# Patient Record
Sex: FEMALE | Race: OTHER | HISPANIC OR LATINO | Employment: STUDENT | URBAN - METROPOLITAN AREA
[De-identification: names, ages, dates, MRNs, and addresses within clinical notes are randomized per-mention and may not be internally consistent; named-entity substitution may affect disease eponyms.]

---
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
## 2022-10-11 ENCOUNTER — OFFICE VISIT (OUTPATIENT)
Dept: URGENT CARE | Facility: MEDICAL CENTER | Age: 22
End: 2022-10-11

## 2022-10-11 VITALS
HEIGHT: 64 IN | TEMPERATURE: 97.8 F | SYSTOLIC BLOOD PRESSURE: 104 MMHG | WEIGHT: 148.15 LBS | OXYGEN SATURATION: 99 % | DIASTOLIC BLOOD PRESSURE: 72 MMHG | RESPIRATION RATE: 16 BRPM | BODY MASS INDEX: 25.29 KG/M2 | HEART RATE: 67 BPM

## 2022-10-11 DIAGNOSIS — Z02.4 DRIVER'S PERMIT PE (PHYSICAL EXAMINATION): Primary | ICD-10-CM

## 2022-10-11 RX ORDER — METRONIDAZOLE 500 MG/1
500 TABLET ORAL 2 TIMES DAILY
COMMUNITY
Start: 2022-10-11 | End: 2022-10-18

## 2022-10-11 NOTE — PROGRESS NOTES
[address] Now        NAME: Dk Coley is a 25 y o  female  : 2000    MRN: [id number]  DATE: 2022  TIME: 5:11 PM    Assessment and Plan   's permit PE (physical examination) [Z02 4]  1  's permit PE (physical examination)           Patient Instructions       Follow up with PCP in 3-5 days  Proceed to  ER if symptoms worsen  Chief Complaint     Chief Complaint   Patient presents with   • Annual Exam     's Permit  Uncorrected vision  Colors WDL  R eye 20/40, L eye 20/20, B eyes 20/20         History of Present Illness       76-year-old female here today for 's form of physical exam   Denies any medical complaint  Review of Systems   Review of Systems   Respiratory: Negative  Cardiovascular: Negative  Gastrointestinal: Negative  Neurological: Negative  All other systems reviewed and are negative  Current Medications       Current Outpatient Medications:   •  metroNIDAZOLE (FLAGYL) 500 mg tablet, Take 500 mg by mouth 2 (two) times a day, Disp: , Rfl:     Current Allergies     Allergies as of 10/11/2022   • (No Known Allergies)            The following portions of the patient's history were reviewed and updated as appropriate: allergies, current medications, past family history, past medical history, past social history, past surgical history and problem list      History reviewed  No pertinent past medical history  History reviewed  No pertinent surgical history  History reviewed  No pertinent family history  Medications have been verified  Objective   /72   Pulse 67   Temp 97 8 °F (36 6 °C)   Resp 16   Ht 5' 4" (1 626 m)   Wt 67 2 kg (148 lb 2 4 oz)   SpO2 99%   BMI 25 43 kg/m²   No LMP recorded  Physical Exam     Physical Exam  Vitals and nursing note reviewed  Constitutional:       Appearance: Normal appearance     HENT:      Right Ear: Tympanic membrane normal       Left Ear: Tympanic membrane normal    Eyes:      Extraocular Movements: Extraocular movements intact  Pupils: Pupils are equal, round, and reactive to light  Cardiovascular:      Rate and Rhythm: Normal rate and regular rhythm  Pulmonary:      Effort: Pulmonary effort is normal       Breath sounds: Normal breath sounds  Musculoskeletal:         General: Normal range of motion  Cervical back: Normal range of motion and neck supple  Neurological:      General: No focal deficit present  Mental Status: She is alert and oriented to person, place, and time

## 2022-10-11 NOTE — PATIENT INSTRUCTIONS
Normal 's permit physical exam   Patient's form completed at this time with no restrictions  Normal Exam   WHAT YOU NEED TO KNOW:   Your healthcare provider did not find a reason for your symptoms today  You may need to follow up with your healthcare provider or a specialist  He will work with you to try to find the cause of your symptoms  He may also run tests to find out more about your overall health  DISCHARGE INSTRUCTIONS:   Follow up with your healthcare provider or a specialist as directed:  Tell your healthcare provider about your symptoms  You may be given a complete physical exam and health checkup  Write down your questions so you remember to ask them during your visits  Maintain a healthy lifestyle:  Healthy foods and regular physical activity can improve your health  They also decrease your risk of heart disease, high blood pressure, and diabetes  Get 30 minutes of activity every day  most days of the week  Ask your healthcare provider which activities are best for you  You can do 30 minutes at once or spread your activity throughout the day to get the recommended amount  Eat a variety of healthy foods  Healthy foods include whole-grain breads, low-fat dairy products, beans, lean meats, and fish  Eat fruits and vegetables every day, especially those that are green, orange, and red  Maintain a healthy weight  Ask your healthcare provider how much you should weigh  Ask him to help you create a weight loss plan if you are overweight  Limit alcohol  Women should limit alcohol to 1 drink a day  Men should limit alcohol to 2 drinks a day  A drink of alcohol is 12 ounces of beer, 5 ounces of wine, or 1½ ounces of liquor  Do not smoke: If you smoke, it is never too late to quit  You lower your risk for many health problems if you quit  Ask your healthcare provider for information if you need help quitting     Contact your healthcare provider if:   Your symptoms get worse, or you have new symptoms that bother you  You have questions or concerns about your condition or care  Your illness makes it difficult to follow a healthy diet  Return to the emergency department if:   You have trouble breathing  You have chest pain  You feel lightheaded or faint  © Copyright [employer] 2022 Information is for End User's use only and may not be sold, redistributed or otherwise used for commercial purposes  All illustrations and images included in CareNotes® are the copyrighted property of [employer]  or Bertha Baez   The above information is an  only  It is not intended as medical advice for individual conditions or treatments  Talk to your doctor, nurse or pharmacist before following any medical regimen to see if it is safe and effective for you

## 2023-05-31 ENCOUNTER — APPOINTMENT (OUTPATIENT)
Dept: LAB | Age: 23
End: 2023-05-31

## 2023-05-31 ENCOUNTER — OCCMED (OUTPATIENT)
Dept: URGENT CARE | Age: 23
End: 2023-05-31

## 2023-05-31 DIAGNOSIS — Z02.1 PHYSICAL EXAM, PRE-EMPLOYMENT: Primary | ICD-10-CM

## 2023-05-31 DIAGNOSIS — Z02.1 PHYSICAL EXAM, PRE-EMPLOYMENT: ICD-10-CM

## 2023-05-31 PROCEDURE — 86480 TB TEST CELL IMMUN MEASURE: CPT

## 2023-05-31 PROCEDURE — 36415 COLL VENOUS BLD VENIPUNCTURE: CPT

## 2023-06-01 LAB
GAMMA INTERFERON BACKGROUND BLD IA-ACNC: 0.03 IU/ML
M TB IFN-G BLD-IMP: NEGATIVE
M TB IFN-G CD4+ BCKGRND COR BLD-ACNC: 0.01 IU/ML
M TB IFN-G CD4+ BCKGRND COR BLD-ACNC: 0.02 IU/ML
MITOGEN IGNF BCKGRD COR BLD-ACNC: >10 IU/ML

## 2024-06-27 ENCOUNTER — APPOINTMENT (OUTPATIENT)
Dept: LAB | Age: 24
End: 2024-06-27

## 2024-06-27 ENCOUNTER — APPOINTMENT (OUTPATIENT)
Dept: URGENT CARE | Age: 24
End: 2024-06-27

## 2024-06-27 ENCOUNTER — OCCMED (OUTPATIENT)
Dept: URGENT CARE | Age: 24
End: 2024-06-27

## 2024-06-27 DIAGNOSIS — Z02.1 PRE-EMPLOYMENT EXAMINATION: ICD-10-CM

## 2024-06-27 DIAGNOSIS — Z02.1 PRE-EMPLOYMENT EXAMINATION: Primary | ICD-10-CM

## 2024-06-27 PROCEDURE — 36415 COLL VENOUS BLD VENIPUNCTURE: CPT

## 2024-06-27 PROCEDURE — 86480 TB TEST CELL IMMUN MEASURE: CPT

## 2024-06-28 LAB
GAMMA INTERFERON BACKGROUND BLD IA-ACNC: 0.01 IU/ML
M TB IFN-G BLD-IMP: NEGATIVE
M TB IFN-G CD4+ BCKGRND COR BLD-ACNC: 0.05 IU/ML
M TB IFN-G CD4+ BCKGRND COR BLD-ACNC: 0.06 IU/ML
MITOGEN IGNF BCKGRD COR BLD-ACNC: 9.99 IU/ML

## 2024-09-27 ENCOUNTER — HOSPITAL ENCOUNTER (EMERGENCY)
Facility: HOSPITAL | Age: 24
Discharge: HOME | End: 2024-09-28
Attending: EMERGENCY MEDICINE

## 2024-09-27 VITALS
RESPIRATION RATE: 16 BRPM | BODY MASS INDEX: 27.62 KG/M2 | HEART RATE: 68 BPM | SYSTOLIC BLOOD PRESSURE: 116 MMHG | DIASTOLIC BLOOD PRESSURE: 66 MMHG | OXYGEN SATURATION: 99 % | TEMPERATURE: 98.4 F | WEIGHT: 160.94 LBS

## 2024-09-27 DIAGNOSIS — K08.89 PAIN, DENTAL: Primary | ICD-10-CM

## 2024-09-27 PROCEDURE — 99282 EMERGENCY DEPT VISIT SF MDM: CPT

## 2024-09-27 PROCEDURE — 99284 EMERGENCY DEPT VISIT MOD MDM: CPT | Performed by: EMERGENCY MEDICINE

## 2024-09-28 RX ORDER — ACETAMINOPHEN 325 MG/1
650 TABLET ORAL ONCE
Status: COMPLETED | OUTPATIENT
Start: 2024-09-28 | End: 2024-09-28

## 2024-09-28 RX ORDER — PENICILLIN V POTASSIUM 500 MG/1
500 TABLET, FILM COATED ORAL 4 TIMES DAILY
Qty: 28 TABLET | Refills: 0 | Status: SHIPPED | OUTPATIENT
Start: 2024-09-28 | End: 2024-10-05

## 2024-09-28 RX ORDER — PENICILLIN V POTASSIUM 250 MG/1
500 TABLET, FILM COATED ORAL ONCE
Status: COMPLETED | OUTPATIENT
Start: 2024-09-28 | End: 2024-09-28

## 2024-09-28 RX ADMIN — PENICILLIN V POTASSIUM 500 MG: 250 TABLET, FILM COATED ORAL at 00:07

## 2024-09-28 RX ADMIN — ACETAMINOPHEN 650 MG: 325 TABLET ORAL at 00:07

## 2024-09-28 NOTE — ED PROVIDER NOTES
Pt Name: Anders Dean  MRN: [id number]  Birthdate 2000  Age/Sex: 24 y.o. female  Date of evaluation: 9/27/2024  PCP: Jasmina Norris MD        FINAL IMPRESSION    Final diagnoses:   Pain, dental         DISPOSITION/PLAN    Time reflects when diagnosis was documented in both MDM as applicable and the Disposition within this note       Time User Action Codes Description Comment    9/28/2024 12:03 AM Jana Arthur [K08.89] Pain, dental           ED Disposition       ED Disposition   Discharge    Condition   Stable    Date/Time   Sat Sep 28, 2024 12:03 AM    Comment   Anders Dean discharge to home/self care.                   Follow-up Information       Follow up With Specialties Details Why Contact Info    Jasmina Norris MD Pediatrics Schedule an appointment as soon as possible for a visit   [address] [place], [place] [id number] [phone]      [facility]  Schedule an appointment as soon as possible for a visit   [facility] [address] [address]  [phone]              PATIENT REFERRED TO:    Jasmina Norris MD  [address] [place] [id number] [phone]    Schedule an appointment as soon as possible for a visit       [facility]  [address] [place] [address]  [phone]  Schedule an appointment as soon as possible for a visit         DISCHARGE MEDICATIONS:    Patient's Medications   Discharge Prescriptions    PENICILLIN V POTASSIUM (VEETID) 500 MG TABLET    Take 1 tablet (500 mg total) by mouth 4 (four) times a day for 7 days       Start Date: 9/28/2024 End Date: 10/5/2024       Order Dose: 500 mg       Quantity: 28 tablet    Refills: 0       No discharge procedures on file.          CHIEF COMPLAINT    Chief Complaint   Patient presents with    Dental Pain     Pt reports left lower toothache for 2 weeks         HPI    Anders presents to the Emergency  Department complaining of dental pain for 2 weeks but more painful today.  She has not had dental insurance so she has not made an appointment to see a dentist but is now worried that it may be infected.   She has been taking motrin with minimal relief.       HPI      Past Medical and Surgical History    History reviewed. No pertinent past medical history.    History reviewed. No pertinent surgical history.    History reviewed. No pertinent family history.    Social History     Tobacco Use    Smoking status: Never    Smokeless tobacco: Never   Vaping Use    Vaping status: Some Days    Substances: Nicotine   Substance Use Topics    Alcohol use: Not Currently    Drug use: Never         .    Allergies    No Known Allergies    Home Medications    Prior to Admission medications    Not on File           Review of Systems    Review of Systems   Constitutional:  Negative for chills and fever.   HENT:  Negative for ear pain and sore throat.    Eyes:  Negative for pain and visual disturbance.   Respiratory:  Negative for cough and shortness of breath.    Cardiovascular:  Negative for chest pain and palpitations.   Gastrointestinal:  Negative for abdominal pain and vomiting.   Genitourinary:  Negative for dysuria and hematuria.   Musculoskeletal:  Negative for arthralgias and back pain.   Skin:  Negative for color change and rash.   Neurological:  Negative for seizures and syncope.   All other systems reviewed and are negative.    Physical Exam      ED Triage Vitals [09/27/24 2322]   Temperature Pulse Respirations Blood Pressure SpO2   98.4 °F (36.9 °C) 68 16 116/66 99 %      Temp Source Heart Rate Source Patient Position - Orthostatic VS BP Location FiO2 (%)   Oral -- -- -- --      Pain Score       7               Physical Exam  Vitals and nursing note reviewed.   Constitutional:       General: She is not in acute distress.     Appearance: She is well-developed.   HENT:      Head: Normocephalic and atraumatic.       Mouth/Throat:      Dentition: Abnormal dentition. Dental tenderness, gingival swelling and dental caries present. No dental abscesses.     Eyes:      Conjunctiva/sclera: Conjunctivae normal.   Cardiovascular:      Rate and Rhythm: Normal rate and regular rhythm.      Heart sounds: No murmur heard.  Pulmonary:      Effort: Pulmonary effort is normal. No respiratory distress.      Breath sounds: Normal breath sounds.   Abdominal:      Palpations: Abdomen is soft.      Tenderness: There is no abdominal tenderness.   Musculoskeletal:         General: No swelling.      Cervical back: Neck supple.   Skin:     General: Skin is warm and dry.      Capillary Refill: Capillary refill takes less than 2 seconds.   Neurological:      Mental Status: She is alert.   Psychiatric:         Mood and Affect: Mood normal.         Assessment and Plan    Anders Dean is a 24 y.o. female who presents with dental pain. Physical examination remarkable for Dental caries on left lower. Differential diagnosis (not completely inclusive) includes infection. Plan will be to prescribe antibiotics and treat symptomatically.      MDM      Diagnostic Results      Labs:      Radiology:    No orders to display       All radiology studies independently viewed by me and interpreted by the radiologist.    Procedure    Procedures      ED Course of Care and Re-Assessments      Medications   penicillin V potassium (VEETID) tablet 500 mg (500 mg Oral Given 9/28/24 0007)   acetaminophen (TYLENOL) tablet 650 mg (650 mg Oral Given 9/28/24 0007)                  Jana Arthur, DO Jana Arthur,   09/28/24 0014

## 2024-11-20 ENCOUNTER — HOSPITAL ENCOUNTER (EMERGENCY)
Facility: HOSPITAL | Age: 24
Discharge: HOME | End: 2024-11-20
Attending: EMERGENCY MEDICINE
Payer: MEDICARE

## 2024-11-20 VITALS
HEART RATE: 106 BPM | BODY MASS INDEX: 27.28 KG/M2 | WEIGHT: 158.95 LBS | OXYGEN SATURATION: 100 % | DIASTOLIC BLOOD PRESSURE: 74 MMHG | SYSTOLIC BLOOD PRESSURE: 124 MMHG | TEMPERATURE: 97.8 F | RESPIRATION RATE: 18 BRPM

## 2024-11-20 DIAGNOSIS — A74.9 CHLAMYDIA: ICD-10-CM

## 2024-11-20 DIAGNOSIS — N39.0 UTI (URINARY TRACT INFECTION): Primary | ICD-10-CM

## 2024-11-20 LAB
AMORPH URATE CRY URNS QL MICRO: ABNORMAL
BACTERIA UR QL AUTO: ABNORMAL /HPF
BILIRUB UR QL STRIP: NEGATIVE
CLARITY UR: ABNORMAL
COLOR UR: YELLOW
EXT PREGNANCY TEST URINE: NEGATIVE
EXT. CONTROL: NORMAL
GLUCOSE UR STRIP-MCNC: NEGATIVE MG/DL
HGB UR QL STRIP.AUTO: NEGATIVE
KETONES UR STRIP-MCNC: NEGATIVE MG/DL
LEUKOCYTE ESTERASE UR QL STRIP: ABNORMAL
MUCOUS THREADS UR QL AUTO: ABNORMAL
NITRITE UR QL STRIP: NEGATIVE
NON-SQ EPI CELLS URNS QL MICRO: ABNORMAL /HPF
PH UR STRIP.AUTO: 8.5 [PH] (ref 4.5–8)
PROT UR STRIP-MCNC: NEGATIVE MG/DL
RBC #/AREA URNS AUTO: ABNORMAL /HPF
SP GR UR STRIP.AUTO: 1.01 (ref 1–1.03)
UROBILINOGEN UR QL STRIP.AUTO: 0.2 E.U./DL
WBC #/AREA URNS AUTO: ABNORMAL /HPF

## 2024-11-20 PROCEDURE — 81025 URINE PREGNANCY TEST: CPT

## 2024-11-20 PROCEDURE — 81001 URINALYSIS AUTO W/SCOPE: CPT

## 2024-11-20 PROCEDURE — 87491 CHLMYD TRACH DNA AMP PROBE: CPT

## 2024-11-20 PROCEDURE — EDAIR PR ED AIR: Performed by: EMERGENCY MEDICINE

## 2024-11-20 PROCEDURE — 99283 EMERGENCY DEPT VISIT LOW MDM: CPT

## 2024-11-20 PROCEDURE — 99284 EMERGENCY DEPT VISIT MOD MDM: CPT

## 2024-11-20 PROCEDURE — 87591 N.GONORRHOEAE DNA AMP PROB: CPT

## 2024-11-20 RX ORDER — CEPHALEXIN 500 MG/1
500 CAPSULE ORAL EVERY 12 HOURS SCHEDULED
Qty: 14 CAPSULE | Refills: 0 | Status: SHIPPED | OUTPATIENT
Start: 2024-11-20 | End: 2024-11-27

## 2024-11-20 RX ADMIN — CEPHALEXIN 500 MG: 250 CAPSULE ORAL at 22:02

## 2024-11-21 LAB
C TRACH DNA SPEC QL NAA+PROBE: POSITIVE
N GONORRHOEA DNA SPEC QL NAA+PROBE: NEGATIVE

## 2024-11-21 NOTE — ED PROVIDER NOTES
"Time reflects when diagnosis was documented in both MDM as applicable and the Disposition within this note       Time User Action Codes Description Comment    11/20/2024  9:59 PM Gertrudis Macedo Add [N39.0] UTI (urinary tract infection)           ED Disposition       ED Disposition   Discharge    Condition   Stable    Date/Time   Wed Nov 20, 2024  9:59 PM    Comment   Tseringmu SAMANTHA Jermaine discharge to home/self care.                   Assessment & Plan       Medical Decision Making  DDx includes  appendicitis, muscle spasm, diverticulitis, UTI, pyelonephritis, bowel obstruction, incarcerated hernia, viral syndrome, hyperglycemia, ovarian torsion, ovarian cyst.    Patient offered ultrasound, she reports that the pain \"is not that bad\".  She would prefer to hold off on ultrasound for now.  Discussed urine pregnancy, UA, swab for gonorrhea chlamydia.  She denies prophylactic treatment at this time. UA shows leukocytes, bacteria. Will treat for UTI with keflex.     Discussed findings from the visit with the patient.  We had a conversation regarding supportive care and indications for return.  Recommended appropriate follow-up.  Patient and/or family understand and agree with plan.    Portions of the record may have been created with voice recognition software. Occasional use of the incorrect word or \"sound a like\" substitutions may have occurred due to the inherent limitations of voice recognition software. Read the chart carefully and recognize, using context, where substitutions have occurred.       Amount and/or Complexity of Data Reviewed  Labs: ordered.    Risk  Prescription drug management.             Medications   cephalexin (KEFLEX) capsule 500 mg (500 mg Oral Given 11/20/24 2202)       ED Risk Strat Scores                           SBIRT 22yo+      Flowsheet Row Most Recent Value   Initial Alcohol Screen: US AUDIT-C     1. How often do you have a drink containing alcohol? 0 Filed at: 11/20/2024 2148   2. How " "many drinks containing alcohol do you have on a typical day you are drinking?  0 Filed at: 11/20/2024 2144   3b. FEMALE Any Age, or MALE 65+: How often do you have 4 or more drinks on one occassion? 0 Filed at: 11/20/2024 2144   Audit-C Score 0 Filed at: 11/20/2024 2144   SHANON: How many times in the past year have you...    Used an illegal drug or used a prescription medication for non-medical reasons? Never Filed at: 11/20/2024 2144                            History of Present Illness       Chief Complaint   Patient presents with    Abdominal Pain     Pt reports \"lower abd pain starting today, denies urinary symptoms, n/v/d.\"        History reviewed. No pertinent past medical history.   History reviewed. No pertinent surgical history.   History reviewed. No pertinent family history.   Social History     Tobacco Use    Smoking status: Never    Smokeless tobacco: Never   Vaping Use    Vaping status: Some Days    Substances: Nicotine   Substance Use Topics    Alcohol use: Not Currently    Drug use: Never      E-Cigarette/Vaping    E-Cigarette Use Current Some Day User       E-Cigarette/Vaping Substances    Nicotine Yes       I have reviewed and agree with the history as documented.     24-year-old female presenting to the emergency department for suprapubic pain x 2 days.  She reports mild discomfort that is a 5-6 in severity.  Reports that her last period was early November, approximately 3 weeks ago.  Unsure of pregnancy status.  Denies vaginal discharge, vaginal itching, vaginal smell.  Denies dysuria, hematuria, urinary frequency.  Denies upper abdominal pain, nausea, changes in bowel pattern.  Denies fevers, chills.      Abdominal Pain  Associated symptoms: no chills, no constipation, no dysuria, no fever, no nausea, no vaginal bleeding, no vaginal discharge and no vomiting        Review of Systems   Constitutional:  Negative for chills and fever.   Gastrointestinal:  Negative for abdominal pain, blood in stool, " constipation, nausea and vomiting.   Genitourinary:  Positive for pelvic pain. Negative for decreased urine volume, difficulty urinating, dysuria, flank pain, frequency, genital sores, menstrual problem, urgency, vaginal bleeding, vaginal discharge and vaginal pain.   Skin:  Negative for rash.   Neurological:  Negative for light-headedness and numbness.           Objective       ED Triage Vitals [11/20/24 2048]   Temperature Pulse Blood Pressure Respirations SpO2 Patient Position - Orthostatic VS   97.8 °F (36.6 °C) (!) 106 124/74 18 100 % Sitting      Temp Source Heart Rate Source BP Location FiO2 (%) Pain Score    Oral Monitor Right arm -- 6      Vitals      Date and Time Temp Pulse SpO2 Resp BP Pain Score FACES Pain Rating User   11/20/24 2048 97.8 °F (36.6 °C) 106 100 % 18 124/74 6 -- NM            Physical Exam  Vitals and nursing note reviewed.   Constitutional:       General: She is not in acute distress.     Appearance: She is well-developed.   HENT:      Head: Normocephalic and atraumatic.   Eyes:      Conjunctiva/sclera: Conjunctivae normal.   Cardiovascular:      Rate and Rhythm: Normal rate and regular rhythm.   Pulmonary:      Effort: Pulmonary effort is normal. No respiratory distress.   Abdominal:      Palpations: Abdomen is soft.      Tenderness: There is abdominal tenderness in the suprapubic area. There is no right CVA tenderness, left CVA tenderness, guarding or rebound.   Musculoskeletal:         General: No swelling.      Cervical back: Neck supple.   Skin:     General: Skin is warm and dry.      Capillary Refill: Capillary refill takes less than 2 seconds.   Neurological:      Mental Status: She is alert.   Psychiatric:         Mood and Affect: Mood normal.         Results Reviewed       Procedure Component Value Units Date/Time    Urine Microscopic [[id number]]  (Abnormal) Collected: 11/20/24 2114    Lab Status: Final result Specimen: Urine, Clean Catch Updated: 11/20/24 2144     RBC, UA 1-2  /hpf      WBC, UA 2-4 /hpf      Epithelial Cells Moderate /hpf      Bacteria, UA Occasional /hpf      MUCUS THREADS Occasional     Amorphous Crystals, UA Occasional    Chlamydia/GC amplified DNA by PCR [[id number]] Collected: 11/20/24 2141    Lab Status: In process Specimen: Cervix Updated: 11/20/24 2144    POCT pregnancy, urine [[id number]]  (Normal) Collected: 11/20/24 2118    Lab Status: Final result Updated: 11/20/24 2119     EXT Preg Test, Ur Negative     Control Valid    Urine Macroscopic, POC [[id number]]  (Abnormal) Collected: 11/20/24 2114    Lab Status: Final result Specimen: Urine Updated: 11/20/24 2115     Color, UA Yellow     Clarity, UA Slightly Cloudy     pH, UA 8.5     Leukocytes, UA Large     Nitrite, UA Negative     Protein, UA Negative mg/dl      Glucose, UA Negative mg/dl      Ketones, UA Negative mg/dl      Urobilinogen, UA 0.2 E.U./dl      Bilirubin, UA Negative     Occult Blood, UA Negative     Specific Gravity, UA 1.015    Narrative:      CLINITEK RESULT            No orders to display       Procedures    ED Medication and Procedure Management   None     Discharge Medication List as of 11/20/2024 10:02 PM        START taking these medications    Details   cephalexin (KEFLEX) 500 mg capsule Take 1 capsule (500 mg total) by mouth every 12 (twelve) hours for 7 days, Starting Wed 11/20/2024, Until Wed 11/27/2024, Normal           No discharge procedures on file.  ED SEPSIS DOCUMENTATION   Time reflects when diagnosis was documented in both MDM as applicable and the Disposition within this note       Time User Action Codes Description Comment    11/20/2024  9:59 PM Gertrudis Macedo Add [N39.0] UTI (urinary tract infection)                  Gertrudis Macedo PA-C  11/21/24 0054

## 2024-11-22 ENCOUNTER — RESULTS FOLLOW-UP (OUTPATIENT)
Dept: OTHER | Facility: HOSPITAL | Age: 24
End: 2024-11-22

## 2024-11-22 NOTE — RESULT ENCOUNTER NOTE
Attempted to call regarding positive Chlamydia results. No Answer. Left generic message. Needs treatment. Sent on [employer]

## 2024-11-23 NOTE — RESULT ENCOUNTER NOTE
Called to inform patient of +chlamydia. Man answered the phone and stated we had the wrong number. Will send a letter to call for results.

## 2024-11-24 RX ORDER — DOXYCYCLINE 100 MG/1
100 CAPSULE ORAL 2 TIMES DAILY
Qty: 14 CAPSULE | Refills: 0 | Status: SHIPPED | OUTPATIENT
Start: 2024-11-24 | End: 2024-12-01

## 2024-11-24 NOTE — ED PROVIDER NOTES
11/24/24  1527 - Patient called for results. Patient informed of +chlamydia. Doxycycline sent to patient's pharmacy.      Rebeca Chapman PA-C  11/24/24 2096

## 2025-01-17 ENCOUNTER — HOSPITAL ENCOUNTER (EMERGENCY)
Facility: HOSPITAL | Age: 25
Discharge: HOME | End: 2025-01-17
Attending: EMERGENCY MEDICINE
Payer: MEDICARE

## 2025-01-17 VITALS
TEMPERATURE: 97.9 F | BODY MASS INDEX: 28.23 KG/M2 | RESPIRATION RATE: 16 BRPM | WEIGHT: 164.46 LBS | HEART RATE: 92 BPM | DIASTOLIC BLOOD PRESSURE: 71 MMHG | OXYGEN SATURATION: 100 % | SYSTOLIC BLOOD PRESSURE: 116 MMHG

## 2025-01-17 DIAGNOSIS — Z34.90 PREGNANCY: ICD-10-CM

## 2025-01-17 DIAGNOSIS — N89.8 VAGINAL DISCHARGE: Primary | ICD-10-CM

## 2025-01-17 LAB
BACTERIA UR QL AUTO: ABNORMAL /HPF
BILIRUB UR QL STRIP: NEGATIVE
CLARITY UR: CLEAR
COLOR UR: YELLOW
EXT PREGNANCY TEST URINE: POSITIVE
GLUCOSE UR STRIP-MCNC: NEGATIVE MG/DL
HGB UR QL STRIP.AUTO: NEGATIVE
KETONES UR STRIP-MCNC: NEGATIVE MG/DL
LEUKOCYTE ESTERASE UR QL STRIP: ABNORMAL
MUCOUS THREADS UR QL AUTO: ABNORMAL
NITRITE UR QL STRIP: NEGATIVE
NON-SQ EPI CELLS URNS QL MICRO: ABNORMAL /HPF
PH UR STRIP.AUTO: 6 [PH]
PROT UR STRIP-MCNC: NEGATIVE MG/DL
RBC #/AREA URNS AUTO: ABNORMAL /HPF
SP GR UR STRIP.AUTO: 1.03 (ref 1–1.03)
UROBILINOGEN UR STRIP-ACNC: <2 MG/DL
WBC #/AREA URNS AUTO: ABNORMAL /HPF

## 2025-01-17 PROCEDURE — 81514 NFCT DS BV&VAGINITIS DNA ALG: CPT

## 2025-01-17 PROCEDURE — 99283 EMERGENCY DEPT VISIT LOW MDM: CPT

## 2025-01-17 PROCEDURE — 81025 URINE PREGNANCY TEST: CPT

## 2025-01-17 PROCEDURE — 81001 URINALYSIS AUTO W/SCOPE: CPT

## 2025-01-17 PROCEDURE — 99284 EMERGENCY DEPT VISIT MOD MDM: CPT

## 2025-01-17 PROCEDURE — 87591 N.GONORRHOEAE DNA AMP PROB: CPT

## 2025-01-17 PROCEDURE — 87491 CHLMYD TRACH DNA AMP PROBE: CPT

## 2025-01-17 RX ORDER — METRONIDAZOLE 7.5 MG/G
1 GEL VAGINAL
Qty: 70 G | Refills: 0 | Status: SHIPPED | OUTPATIENT
Start: 2025-01-17 | End: 2025-01-22

## 2025-01-17 NOTE — ED PROVIDER NOTES
Time reflects when diagnosis was documented in both MDM as applicable and the Disposition within this note       Time User Action Codes Description Comment    1/17/2025  3:51 PM Curtis Sow Add [N89.8] Vaginal discharge     1/17/2025  3:51 PM Curtis Sow Add [Z34.90] Pregnancy           ED Disposition       ED Disposition   Discharge    Condition   Stable    Date/Time   Fri Jan 17, 2025  3:50 PM    Comment   Anders Dean discharge to home/self care.                   Assessment & Plan       Medical Decision Making  Preg test positive here. Not having any bleeding or pain so advised OB follow up.   Will treat for BV- metrogel prescribed  Also tested for vaginosis panel and gonorrhea/chlamydia- will call if positive results and further treatment needed.     I have discussed the plan to discharge pt from ED. The patient was discharged in stable condition.  Patient ambulated off the department.  Extensive return to emergency department precautions were discussed.  Follow up with appropriate providers including primary care physician was discussed.  Patient and/or their  primary decision maker expressed understanding.  Patient remained stable during entire emergency department stay.      Amount and/or Complexity of Data Reviewed  Labs: ordered. Decision-making details documented in ED Course.    Risk  Prescription drug management.        ED Course as of 01/17/25 1631   Fri Jan 17, 2025   1546 PREGNANCY TEST URINE(!): Positive       Medications - No data to display    ED Risk Strat Scores                          SBIRT 22yo+      Flowsheet Row Most Recent Value   Initial Alcohol Screen: US AUDIT-C     1. How often do you have a drink containing alcohol? 0 Filed at: 01/17/2025 1504   2. How many drinks containing alcohol do you have on a typical day you are drinking?  0 Filed at: 01/17/2025 1504   3a. Male UNDER 65: How often do you have five or more drinks on one occasion? 0 Filed at: 01/17/2025 1504   3b.  FEMALE Any Age, or MALE 65+: How often do you have 4 or more drinks on one occassion? 0 Filed at: 01/17/2025 150   Audit-C Score 0 Filed at: 01/17/2025 1501   SHANON: How many times in the past year have you...    Used an illegal drug or used a prescription medication for non-medical reasons? Never Filed at: 01/17/2025 1504                            History of Present Illness       Chief Complaint   Patient presents with    Vaginal Itching     Pt states she has been having vaginal itching/foul odor. Pt states she frequently gets BV and also has a hx of HSV. Pt recently had a positive pregnancy test at home and is unsure of safe treatment options for symptoms.        History reviewed. No pertinent past medical history.   History reviewed. No pertinent surgical history.   History reviewed. No pertinent family history.   Social History     Tobacco Use    Smoking status: Never    Smokeless tobacco: Never   Vaping Use    Vaping status: Some Days    Substances: Nicotine   Substance Use Topics    Alcohol use: Not Currently    Drug use: Never      E-Cigarette/Vaping    E-Cigarette Use Current Some Day User       E-Cigarette/Vaping Substances    Nicotine Yes       I have reviewed and agree with the history as documented.     24 YOF presents today with report of foul smelling vaginal discharge. Reports it feels like BV, has had it in the past. Also reporting that she had a home positive pregnancy test. Denies any pain/cramping or vaginal bleeding. LMP end of December.         Review of Systems   Gastrointestinal:  Negative for abdominal pain, nausea and vomiting.   Genitourinary:  Positive for vaginal discharge. Negative for dysuria, frequency, pelvic pain, urgency and vaginal bleeding.   All other systems reviewed and are negative.          Objective       ED Triage Vitals [01/17/25 1508]   Temperature Pulse Blood Pressure Respirations SpO2 Patient Position - Orthostatic VS   97.9 °F (36.6 °C) 92 116/71 16 100 % Sitting       Temp Source Heart Rate Source BP Location FiO2 (%) Pain Score    Oral Monitor Right arm -- --      Vitals      Date and Time Temp Pulse SpO2 Resp BP Pain Score FACES Pain Rating User   01/17/25 1508 97.9 °F (36.6 °C) 92 100 % 16 116/71 -- -- AB            Physical Exam  Vitals and nursing note reviewed.   Constitutional:       General: She is not in acute distress.     Appearance: Normal appearance. She is well-developed.   HENT:      Head: Normocephalic and atraumatic.   Eyes:      Conjunctiva/sclera: Conjunctivae normal.   Cardiovascular:      Rate and Rhythm: Normal rate and regular rhythm.      Heart sounds: No murmur heard.  Pulmonary:      Effort: Pulmonary effort is normal.   Abdominal:      Palpations: Abdomen is soft.      Tenderness: There is no abdominal tenderness.   Musculoskeletal:         General: No swelling. Normal range of motion.      Cervical back: Normal range of motion and neck supple.   Skin:     General: Skin is warm and dry.      Capillary Refill: Capillary refill takes less than 2 seconds.   Neurological:      Mental Status: She is alert.   Psychiatric:         Mood and Affect: Mood normal.         Behavior: Behavior normal.         Results Reviewed       Procedure Component Value Units Date/Time    Urine Microscopic [[id number]]  (Abnormal) Collected: 01/17/25 1538    Lab Status: Final result Specimen: Urine, Clean Catch Updated: 01/17/25 1556     RBC, UA 1-2 /hpf      WBC, UA 4-10 /hpf      Epithelial Cells Occasional /hpf      Bacteria, UA Occasional /hpf      MUCUS THREADS Occasional    UA w Reflex to Microscopic w Reflex to Culture [[id number]]  (Abnormal) Collected: 01/17/25 1538    Lab Status: Final result Specimen: Urine, Clean Catch Updated: 01/17/25 1554     Color, UA Yellow     Clarity, UA Clear     Specific Gravity, UA 1.030     pH, UA 6.0     Leukocytes, UA Large     Nitrite, UA Negative     Protein, UA Negative mg/dl      Glucose, UA Negative mg/dl      Ketones, UA Negative  mg/dl      Urobilinogen, UA <2.0 mg/dl      Bilirubin, UA Negative     Occult Blood, UA Negative    Molecular Vaginal Panel [[id number]] Collected: 01/17/25 1542    Lab Status: In process Specimen: Genital from Vaginal Updated: 01/17/25 1545    Chlamydia/GC amplified DNA by PCR [[id number]] Collected: 01/17/25 1539    Lab Status: In process Specimen: Vaginal Updated: 01/17/25 1545    POCT pregnancy, urine [[id number]]  (Abnormal) Collected: 01/17/25 1540    Lab Status: Final result Updated: 01/17/25 1540     EXT Preg Test, Ur Positive     Control --            No orders to display       Procedures    ED Medication and Procedure Management   None     Discharge Medication List as of 1/17/2025  3:52 PM        START taking these medications    Details   metroNIDAZOLE (METROGEL) 0.75 % vaginal gel Insert 1 Application into the vagina daily at bedtime for 5 days, Starting Fri 1/17/2025, Until Wed 1/22/2025, Normal           No discharge procedures on file.  ED SEPSIS DOCUMENTATION   Time reflects when diagnosis was documented in both MDM as applicable and the Disposition within this note       Time User Action Codes Description Comment    1/17/2025  3:51 PM Curtis Sow Add [N89.8] Vaginal discharge     1/17/2025  3:51 PM Curtis Sow Add [Z34.90] Pregnancy                  Curtis Sow PA-C  01/17/25 2285

## 2025-01-20 LAB
C GLABRATA DNA VAG QL NAA+PROBE: NEGATIVE
C KRUSEI DNA VAG QL NAA+PROBE: NEGATIVE
C TRACH DNA SPEC QL NAA+PROBE: NEGATIVE
CANDIDA SP 6 PNL VAG NAA+PROBE: NEGATIVE
N GONORRHOEA DNA SPEC QL NAA+PROBE: NEGATIVE
T VAGINALIS DNA VAG QL NAA+PROBE: NEGATIVE
VAGINOSIS/ITIS DNA PNL VAG PROBE+SIG AMP: POSITIVE

## 2025-01-21 ENCOUNTER — RESULTS FOLLOW-UP (OUTPATIENT)
Dept: OTHER | Facility: HOSPITAL | Age: 25
End: 2025-01-21